# Patient Record
Sex: MALE | Race: WHITE | NOT HISPANIC OR LATINO
[De-identification: names, ages, dates, MRNs, and addresses within clinical notes are randomized per-mention and may not be internally consistent; named-entity substitution may affect disease eponyms.]

---

## 2020-04-08 ENCOUNTER — TRANSCRIPTION ENCOUNTER (OUTPATIENT)
Age: 20
End: 2020-04-08

## 2021-08-10 ENCOUNTER — TRANSCRIPTION ENCOUNTER (OUTPATIENT)
Age: 21
End: 2021-08-10

## 2021-08-11 ENCOUNTER — EMERGENCY (EMERGENCY)
Facility: HOSPITAL | Age: 21
LOS: 1 days | Discharge: HOME | End: 2021-08-11
Attending: EMERGENCY MEDICINE
Payer: COMMERCIAL

## 2021-08-11 VITALS
SYSTOLIC BLOOD PRESSURE: 113 MMHG | TEMPERATURE: 98 F | HEART RATE: 89 BPM | DIASTOLIC BLOOD PRESSURE: 70 MMHG | WEIGHT: 182.1 LBS | OXYGEN SATURATION: 98 % | RESPIRATION RATE: 15 BRPM | HEIGHT: 72 IN

## 2021-08-11 PROCEDURE — 99284 EMERGENCY DEPT VISIT MOD MDM: CPT

## 2021-08-11 PROCEDURE — 99283 EMERGENCY DEPT VISIT LOW MDM: CPT

## 2021-08-11 RX ORDER — ACETAMINOPHEN 500 MG
650 TABLET ORAL ONCE
Refills: 0 | Status: COMPLETED | OUTPATIENT
Start: 2021-08-11 | End: 2021-08-11

## 2021-08-11 NOTE — ED PROVIDER NOTE - PHYSICAL EXAMINATION
A&Ox3, NAD. NCAT. PERRL, EOMI. Ni periorbital ttp or facial tenderness to palpation, Neck supple, no LAD. Lungs CTAB. +S1S2, RRR, No m/r/g. Abd soft, NT/ND, +BS, no rebound or guarding. Extremities: cap refill <2, pulses in distal extremities 4+, no edema. Skin: + Linear 2.0-2.5 cm laceration under  CN II-XII intact. Strength 5/5 UE/LE. Sensations intact throughout. Gait steady. A&Ox3, NAD. NCAT. PERRL, EOMI. Ni periorbital ttp or facial tenderness to palpation, Neck supple, no LAD. Lungs CTAB. +S1S2, RRR, No m/r/g. Abd soft, NT/ND, +BS, no rebound or guarding. Extremities: cap refill <2, pulses in distal extremities 4+, no edema. Skin: + Linear 2.5 cm laceration under left eye brow and 0.5 cm under left eye. Noth without FB or active bleeding CN II-XII intact. Strength 5/5 UE/LE. Sensations intact throughout. Gait steady.

## 2021-08-11 NOTE — ED PROVIDER NOTE - CARE PROVIDER_API CALL
Anaya Andres (MD)  Plastic Surgery  [address] [place] [address], [address]  [place], [place] [id number]  Phone: [phone]  Fax: [phone]  Follow Up Time: 4-6 Days

## 2021-08-11 NOTE — ED PROVIDER NOTE - ATTENDING CONTRIBUTION TO CARE
Attending MD Harp:   I personally have seen and examined this patient.  Physician assistant note reviewed and agree on plan of care and except where noted.  See HPI, PE, and MDM for details.

## 2021-08-11 NOTE — ED PROVIDER NOTE - NSFOLLOWUPINSTRUCTIONS_ED_ALL_ED_FT
- stay hydrated.   - take tylenol 975mg and ibuprofen 600mg every 6 hours as needed for pain-take with meals.  - follow up with Dr Andres in 1 week  -Keep area clean and dry, keep face out of the sun as much as possible (wear hat or cover with band-aid when outside)  - return if symptoms worsen, fever, weakness, numbness/tingling, blurred vision, difficulty ambulating and all other concerns. - stay hydrated.   - take tylenol 975mg and ibuprofen 600mg every 6 hours as needed for pain-take with meals.  - follow up with Dr Andres as scheduled on 8/16/21  -Keep area clean and dry, keep face out of the sun as much as possible (wear hat or cover with band-aid when outside)  - return if symptoms worsen, fever, weakness, numbness/tingling, blurred vision, difficulty ambulating and all other concerns.

## 2021-08-11 NOTE — ED PROVIDER NOTE - OBJECTIVE STATEMENT
20 yo male with no pmh, utd on tdap here for laceration to left eyebrow. Pt states he [place] and ran into a sign. No loc, no other injuries. No numbness, tingling, weakness, eye pain, difficulty moving eye, blurred vision, changes in vision.

## 2021-08-11 NOTE — ED ADULT TRIAGE NOTE - WILL THE PATIENT ACCEPT THE PFIZER COVID-19 VACCINE IF ELIGIBLE AND IT IS AVAILABLE?
S[facility] Approved    Filling Pharmacy: [facility]  Additional Information: Patient will need to follow up with their pharmacy to check the availability of the vaccine.
No

## 2021-08-11 NOTE — ED PROVIDER NOTE - PATIENT PORTAL LINK FT
You can access the FollowMyHealth Patient Portal offered by [facility] by registering at the following website: [email or web address]. By joining [facility]’s FollowMyHealth portal, you will also be able to view your health information using other applications (apps) compatible with our system.

## 2021-08-11 NOTE — ED PROVIDER NOTE - CLINICAL SUMMARY MEDICAL DECISION MAKING FREE TEXT BOX
Attending MD Harp:   21M presenting for evaluation of laceration to left upper eyelid, struck face on a sign. No LOC, normal eye exam. No bony ttp of left orbit. Patient requesting plastic surgery repair of laceration. Tetanus UTD. Will consult plastic surgery.       *The above represents an initial assessment/impression. Please refer to progress notes for potential changes in patient clinical course*

## 2021-08-11 NOTE — ED ADULT NURSE NOTE - OBJECTIVE STATEMENT
Ambulatory, well-appearing patient in no apparent distress complains of laceration.  [place] was [place] and was walking but looking at someone else and accidentally walked into a rock, +laceration above left eye just below eyebrow.  No fall or LOC, no blood thinners.  No active bleeding, denies pain, declines pain medications, tetanus 2 years ago.  Requests plastics.  Dad bedside. Ambulatory, well-appearing patient in no apparent distress complains of laceration.  Pt was [place] and was walking but looking at someone else and accidentally walked into a rock, +linear laceration above left eye just below eyebrow appx 2cm.  No fall or LOC, no blood thinners.  No active bleeding, denies pain, declines pain medications, tetanus 2 years ago.  Requests plastics.  Dad bedside.

## 2021-10-10 ENCOUNTER — TRANSCRIPTION ENCOUNTER (OUTPATIENT)
Age: 21
End: 2021-10-10

## 2022-02-10 ENCOUNTER — TRANSCRIPTION ENCOUNTER (OUTPATIENT)
Age: 22
End: 2022-02-10

## 2022-03-17 ENCOUNTER — TRANSCRIPTION ENCOUNTER (OUTPATIENT)
Age: 22
End: 2022-03-17

## 2022-06-08 ENCOUNTER — NON-APPOINTMENT (OUTPATIENT)
Age: 22
End: 2022-06-08

## 2023-02-02 ENCOUNTER — APPOINTMENT (OUTPATIENT)
Dept: HUMAN REPRODUCTION | Facility: CLINIC | Age: 23
End: 2023-02-02
Payer: SELF-PAY

## 2023-02-02 ENCOUNTER — APPOINTMENT (OUTPATIENT)
Dept: HUMAN REPRODUCTION | Facility: CLINIC | Age: 23
End: 2023-02-02

## 2023-02-02 PROCEDURE — 36415 COLL VENOUS BLD VENIPUNCTURE: CPT

## 2023-04-20 ENCOUNTER — NON-APPOINTMENT (OUTPATIENT)
Age: 23
End: 2023-04-20

## 2023-07-29 ENCOUNTER — APPOINTMENT (OUTPATIENT)
Dept: HUMAN REPRODUCTION | Facility: CLINIC | Age: 23
End: 2023-07-29
Payer: COMMERCIAL

## 2023-07-29 PROCEDURE — ZZZZZ: CPT

## 2023-09-07 ENCOUNTER — APPOINTMENT (OUTPATIENT)
Dept: HUMAN REPRODUCTION | Facility: CLINIC | Age: 23
End: 2023-09-07
Payer: SELF-PAY

## 2023-09-07 PROCEDURE — ZZZZZ: CPT

## 2024-08-18 ENCOUNTER — NON-APPOINTMENT (OUTPATIENT)
Age: 24
End: 2024-08-18